# Patient Record
Sex: FEMALE | Race: WHITE | Employment: UNEMPLOYED | URBAN - METROPOLITAN AREA
[De-identification: names, ages, dates, MRNs, and addresses within clinical notes are randomized per-mention and may not be internally consistent; named-entity substitution may affect disease eponyms.]

---

## 2018-12-16 ENCOUNTER — APPOINTMENT (OUTPATIENT)
Dept: GENERAL RADIOLOGY | Age: 53
End: 2018-12-16
Payer: COMMERCIAL

## 2018-12-16 ENCOUNTER — HOSPITAL ENCOUNTER (EMERGENCY)
Age: 53
Discharge: HOME | End: 2018-12-16
Attending: EMERGENCY MEDICINE
Payer: COMMERCIAL

## 2018-12-16 VITALS
SYSTOLIC BLOOD PRESSURE: 104 MMHG | HEIGHT: 65 IN | HEART RATE: 74 BPM | RESPIRATION RATE: 16 BRPM | OXYGEN SATURATION: 100 % | BODY MASS INDEX: 46.65 KG/M2 | DIASTOLIC BLOOD PRESSURE: 47 MMHG | WEIGHT: 280 LBS | TEMPERATURE: 99 F

## 2018-12-16 DIAGNOSIS — J20.9 ACUTE BRONCHITIS, UNSPECIFIED ORGANISM: ICD-10-CM

## 2018-12-16 DIAGNOSIS — J06.9 VIRAL URI: Primary | ICD-10-CM

## 2018-12-16 DIAGNOSIS — J44.9 CHRONIC OBSTRUCTIVE PULMONARY DISEASE, UNSPECIFIED COPD TYPE (HCC): ICD-10-CM

## 2018-12-16 LAB
A/G RATIO: 1.3 (ref 1.1–2.2)
ALBUMIN SERPL-MCNC: 4 G/DL (ref 3.4–5)
ALP BLD-CCNC: 73 U/L (ref 40–129)
ALT SERPL-CCNC: 22 U/L (ref 10–40)
ANION GAP SERPL CALCULATED.3IONS-SCNC: 9 MMOL/L (ref 3–16)
AST SERPL-CCNC: 19 U/L (ref 15–37)
ATYPICAL LYMPHOCYTE RELATIVE PERCENT: 18 % (ref 0–6)
BASOPHILS ABSOLUTE: 0 K/UL (ref 0–0.2)
BASOPHILS RELATIVE PERCENT: 0 %
BILIRUB SERPL-MCNC: 0.8 MG/DL (ref 0–1)
BUN BLDV-MCNC: 12 MG/DL (ref 7–20)
CALCIUM SERPL-MCNC: 8.8 MG/DL (ref 8.3–10.6)
CHLORIDE BLD-SCNC: 104 MMOL/L (ref 99–110)
CO2: 25 MMOL/L (ref 21–32)
CREAT SERPL-MCNC: 0.8 MG/DL (ref 0.6–1.1)
EKG ATRIAL RATE: 69 BPM
EKG DIAGNOSIS: NORMAL
EKG P AXIS: 50 DEGREES
EKG P-R INTERVAL: 172 MS
EKG Q-T INTERVAL: 406 MS
EKG QRS DURATION: 92 MS
EKG QTC CALCULATION (BAZETT): 435 MS
EKG R AXIS: -9 DEGREES
EKG T AXIS: 33 DEGREES
EKG VENTRICULAR RATE: 69 BPM
EOSINOPHILS ABSOLUTE: 0 K/UL (ref 0–0.6)
EOSINOPHILS RELATIVE PERCENT: 0 %
GFR AFRICAN AMERICAN: >60
GFR NON-AFRICAN AMERICAN: >60
GLOBULIN: 3.2 G/DL
GLUCOSE BLD-MCNC: 89 MG/DL (ref 70–99)
HCT VFR BLD CALC: 43.4 % (ref 36–48)
HEMATOLOGY PATH CONSULT: YES
HEMOGLOBIN: 14.6 G/DL (ref 12–16)
LIPASE: 34 U/L (ref 13–60)
LYMPHOCYTES ABSOLUTE: 1.5 K/UL (ref 1–5.1)
LYMPHOCYTES RELATIVE PERCENT: 37 %
MCH RBC QN AUTO: 30.7 PG (ref 26–34)
MCHC RBC AUTO-ENTMCNC: 33.6 G/DL (ref 31–36)
MCV RBC AUTO: 91.5 FL (ref 80–100)
MONOCYTES ABSOLUTE: 0.2 K/UL (ref 0–1.3)
MONOCYTES RELATIVE PERCENT: 8 %
NEUTROPHILS ABSOLUTE: 1 K/UL (ref 1.7–7.7)
NEUTROPHILS RELATIVE PERCENT: 37 %
PDW BLD-RTO: 13.6 % (ref 12.4–15.4)
PLATELET # BLD: 173 K/UL (ref 135–450)
PLATELET SLIDE REVIEW: ADEQUATE
PMV BLD AUTO: 9 FL (ref 5–10.5)
POTASSIUM SERPL-SCNC: 3.8 MMOL/L (ref 3.5–5.1)
RAPID INFLUENZA  B AGN: NEGATIVE
RAPID INFLUENZA A AGN: NEGATIVE
RBC # BLD: 4.74 M/UL (ref 4–5.2)
RBC # BLD: NORMAL 10*6/UL
SLIDE REVIEW: ABNORMAL
SODIUM BLD-SCNC: 138 MMOL/L (ref 136–145)
TOTAL PROTEIN: 7.2 G/DL (ref 6.4–8.2)
TROPONIN: <0.01 NG/ML
TROPONIN: <0.01 NG/ML
WBC # BLD: 2.8 K/UL (ref 4–11)

## 2018-12-16 PROCEDURE — 93005 ELECTROCARDIOGRAM TRACING: CPT | Performed by: PHYSICIAN ASSISTANT

## 2018-12-16 PROCEDURE — 87804 INFLUENZA ASSAY W/OPTIC: CPT

## 2018-12-16 PROCEDURE — 96360 HYDRATION IV INFUSION INIT: CPT

## 2018-12-16 PROCEDURE — 93010 ELECTROCARDIOGRAM REPORT: CPT | Performed by: INTERNAL MEDICINE

## 2018-12-16 PROCEDURE — 85025 COMPLETE CBC W/AUTO DIFF WBC: CPT

## 2018-12-16 PROCEDURE — 83690 ASSAY OF LIPASE: CPT

## 2018-12-16 PROCEDURE — 80053 COMPREHEN METABOLIC PANEL: CPT

## 2018-12-16 PROCEDURE — 96361 HYDRATE IV INFUSION ADD-ON: CPT

## 2018-12-16 PROCEDURE — 71046 X-RAY EXAM CHEST 2 VIEWS: CPT

## 2018-12-16 PROCEDURE — 84484 ASSAY OF TROPONIN QUANT: CPT

## 2018-12-16 PROCEDURE — 99285 EMERGENCY DEPT VISIT HI MDM: CPT

## 2018-12-16 PROCEDURE — 2580000003 HC RX 258: Performed by: PHYSICIAN ASSISTANT

## 2018-12-16 PROCEDURE — 6370000000 HC RX 637 (ALT 250 FOR IP): Performed by: PHYSICIAN ASSISTANT

## 2018-12-16 RX ORDER — IPRATROPIUM BROMIDE AND ALBUTEROL SULFATE 2.5; .5 MG/3ML; MG/3ML
1 SOLUTION RESPIRATORY (INHALATION) ONCE
Status: COMPLETED | OUTPATIENT
Start: 2018-12-16 | End: 2018-12-16

## 2018-12-16 RX ORDER — 0.9 % SODIUM CHLORIDE 0.9 %
1000 INTRAVENOUS SOLUTION INTRAVENOUS ONCE
Status: COMPLETED | OUTPATIENT
Start: 2018-12-16 | End: 2018-12-16

## 2018-12-16 RX ORDER — ASPIRIN 81 MG/1
324 TABLET, CHEWABLE ORAL ONCE
Status: COMPLETED | OUTPATIENT
Start: 2018-12-16 | End: 2018-12-16

## 2018-12-16 RX ORDER — BROMPHENIRAMINE MALEATE, PSEUDOEPHEDRINE HYDROCHLORIDE, AND DEXTROMETHORPHAN HYDROBROMIDE 2; 30; 10 MG/5ML; MG/5ML; MG/5ML
10 SYRUP ORAL 4 TIMES DAILY PRN
Qty: 120 ML | Refills: 0 | Status: SHIPPED | OUTPATIENT
Start: 2018-12-16 | End: 2021-11-03

## 2018-12-16 RX ORDER — PREDNISONE 10 MG/1
60 TABLET ORAL DAILY
Qty: 30 TABLET | Refills: 0 | Status: SHIPPED | OUTPATIENT
Start: 2018-12-16 | End: 2018-12-21

## 2018-12-16 RX ORDER — HYDROCODONE BITARTRATE AND ACETAMINOPHEN 5; 325 MG/1; MG/1
1 TABLET ORAL EVERY 8 HOURS PRN
COMMUNITY

## 2018-12-16 RX ADMIN — IPRATROPIUM BROMIDE AND ALBUTEROL SULFATE 1 AMPULE: .5; 3 SOLUTION RESPIRATORY (INHALATION) at 17:37

## 2018-12-16 RX ADMIN — SODIUM CHLORIDE 1000 ML: 9 INJECTION, SOLUTION INTRAVENOUS at 17:37

## 2018-12-16 RX ADMIN — ASPIRIN 81 MG 324 MG: 81 TABLET ORAL at 17:37

## 2018-12-16 ASSESSMENT — ENCOUNTER SYMPTOMS
ABDOMINAL PAIN: 0
CHEST TIGHTNESS: 0
NAUSEA: 1
RHINORRHEA: 0
EYE PAIN: 0
BACK PAIN: 0
FACIAL SWELLING: 0
COUGH: 1
CONSTIPATION: 0
SHORTNESS OF BREATH: 1
VOMITING: 1
DIARRHEA: 1
SORE THROAT: 0
EYE REDNESS: 0

## 2018-12-16 ASSESSMENT — PAIN DESCRIPTION - PAIN TYPE: TYPE: ACUTE PAIN

## 2018-12-16 ASSESSMENT — HEART SCORE
ECG: 0
ECG: 0

## 2018-12-16 ASSESSMENT — PAIN SCALES - GENERAL: PAINLEVEL_OUTOF10: 5

## 2018-12-16 NOTE — ED PROVIDER NOTES
[address] ED  eMERGENCY dEPARTMENT eNCOUnter        Pt Name: Raymond Mcclain  MRN: [id number]  [place] 1965  Date of evaluation: 12/16/2018  Provider: Ying Chaparro PA-C  PCP: SUSAN Walker CNP    This patient was seen and evaluated by the attending physician No att. providers found. CHIEF COMPLAINT       Chief Complaint   Patient presents with    Shortness of Breath     pt c/o shortness of breath with coughing that started 3 days ago. Pt also c/o diarrhea and vomiting that started on thursday    Cough    Diarrhea       HISTORY OF PRESENT ILLNESS   (Location/Symptom, Timing/Onset, Context/Setting, Quality, Duration, Modifying Factors, Severity)  Note limiting factors. Raymond Mcclain is a 52 y.o. female clinic for evaluation of cough, chest pain, shortness of breath and diarrhea. She states she's been feeling symptoms for the past 3 days. She states that she started with coughing and nasal congestion which was draining down her throat. States this is causing a cough which is hacking and worse when she lies flat. States that yesterday she began to have some crampy diarrhea and became nauseous. States she vomited once yesterday but has not vomited any today. States she does feel a pressure sensation in her chest which feels like an elephant is sitting on it. She denies any history of heart attack. She denies any current nausea or vomiting. Denies abdominal pain. She does report she has a history of COPD and has felt more short of breath lately. Nursing Notes were all reviewed and agreed with or any disagreements were addressed  in the HPI. REVIEW OF SYSTEMS    (2-9 systems for level 4, 10 or more for level 5)     Review of Systems   Constitutional: Positive for fatigue. Negative for diaphoresis and fever. HENT: Negative for ear pain, facial swelling, postnasal drip, rhinorrhea and sore throat.     Eyes: Negative for pain, redness and visual status: Never Smoker    Smokeless tobacco: Never Used    Alcohol use Yes      Comment: occ    Drug use: No    Sexual activity: Not Asked     Other Topics Concern    None     Social History Narrative    None       SCREENINGS    [place] Coma Scale  Eye Opening: Spontaneous  Best Verbal Response: Oriented  Best Motor Response: Obeys commands  [place] Coma Scale Score: 15 Heart Score for chest pain patients  History: Moderately Suspicious  ECG: Normal  Patient Age: > 39 and < 65 years  *Risk factors for Atherosclerotic disease: Hypertension, Obesity  Risk Factors: 1 or 2 risk factors  Troponin: < 1X normal limit  Heart Score Total: 3      PHYSICAL EXAM    (up to 7 for level 4, 8 or more for level 5)     ED Triage Vitals [12/16/18 1648]   BP Temp Temp Source Pulse Resp SpO2 Height Weight   131/75 99 °F (37.2 °C) Temporal 68 18 97 % 5' 5\" (1.651 m) 280 lb (127 kg)       Physical Exam   Constitutional: She is oriented to person, place, and time. She appears well-developed and well-nourished. HENT:   Head: Normocephalic and atraumatic. Right Ear: Hearing and tympanic membrane normal.   Left Ear: Hearing and tympanic membrane normal.   Nose: Mucosal edema and rhinorrhea present. Right sinus exhibits no maxillary sinus tenderness and no frontal sinus tenderness. Left sinus exhibits no maxillary sinus tenderness and no frontal sinus tenderness. Mouth/Throat: Oropharynx is clear and moist. No oropharyngeal exudate, posterior oropharyngeal edema or posterior oropharyngeal erythema. Eyes: Pupils are equal, round, and reactive to light. EOM are normal. Right eye exhibits no discharge. Left eye exhibits no discharge. Neck: Normal range of motion. Neck supple. Cardiovascular: Normal rate, regular rhythm and normal heart sounds. Exam reveals no gallop and no friction rub. No murmur heard. Pulmonary/Chest: Effort normal. No respiratory distress. She has wheezes. She has no rales. Abdominal: Soft.  Bowel sounds

## 2018-12-17 LAB — HEMATOLOGY PATH CONSULT: NORMAL

## 2019-07-30 ENCOUNTER — APPOINTMENT (OUTPATIENT)
Dept: GENERAL RADIOLOGY | Age: 54
End: 2019-07-30

## 2019-07-30 ENCOUNTER — HOSPITAL ENCOUNTER (EMERGENCY)
Age: 54
Discharge: HOME | End: 2019-07-30

## 2019-07-30 VITALS
RESPIRATION RATE: 18 BRPM | WEIGHT: 280 LBS | SYSTOLIC BLOOD PRESSURE: 144 MMHG | TEMPERATURE: 97.7 F | BODY MASS INDEX: 45 KG/M2 | OXYGEN SATURATION: 98 % | DIASTOLIC BLOOD PRESSURE: 81 MMHG | HEIGHT: 66 IN | HEART RATE: 76 BPM
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
## 2019-07-30 DIAGNOSIS — M79.604 RIGHT LEG PAIN: Primary | ICD-10-CM

## 2019-07-30 PROCEDURE — 73590 X-RAY EXAM OF LOWER LEG: CPT

## 2019-07-30 PROCEDURE — 73600 X-RAY EXAM OF ANKLE: CPT

## 2019-07-30 PROCEDURE — 6370000000 HC RX 637 (ALT 250 FOR IP): Performed by: NURSE PRACTITIONER

## 2019-07-30 PROCEDURE — 93971 EXTREMITY STUDY: CPT

## 2019-07-30 PROCEDURE — 73562 X-RAY EXAM OF KNEE 3: CPT

## 2019-07-30 PROCEDURE — 99283 EMERGENCY DEPT VISIT LOW MDM: CPT

## 2019-07-30 RX ORDER — NAPROXEN 500 MG/1
500 TABLET ORAL 2 TIMES DAILY
Qty: 20 TABLET | Refills: 0 | Status: SHIPPED | OUTPATIENT
Start: 2019-07-30 | End: 2021-11-03

## 2019-07-30 RX ORDER — HYDROCODONE BITARTRATE AND ACETAMINOPHEN 5; 325 MG/1; MG/1
1 TABLET ORAL ONCE
Status: COMPLETED | OUTPATIENT
Start: 2019-07-30 | End: 2019-07-30

## 2019-07-30 RX ADMIN — HYDROCODONE BITARTRATE AND ACETAMINOPHEN 1 TABLET: 5; 325 TABLET ORAL at 12:49

## 2019-07-30 ASSESSMENT — ENCOUNTER SYMPTOMS
SHORTNESS OF BREATH: 0
COUGH: 0
ABDOMINAL PAIN: 0

## 2019-07-30 ASSESSMENT — PAIN DESCRIPTION - PAIN TYPE: TYPE: ACUTE PAIN

## 2019-07-30 ASSESSMENT — PAIN SCALES - GENERAL: PAINLEVEL_OUTOF10: 8

## 2019-07-30 NOTE — ED NOTES
Ace wrap placed on pt right leg    Pt declined crutches states she has a cane at home that she will use.       Elizabeth Galaviz RN  07/30/19 4347

## 2019-07-30 NOTE — ED PROVIDER NOTES
Evaluated by [id number] [facility] Provider          [facility] ED  eMERGENCY dEPARTMENT eNCOUnter        Pt Name: Chucho Graham  MRN: [id number]  [place] 1965  Dateof evaluation: 7/30/2019  Provider: SUSAN Corey CNP  PCP: SUSAN Treviño CNP  ED Attending: No att. providers found    [address] [place] [address]       Chief Complaint   Patient presents with    Leg Pain     pt c/o right leg pain with numbness from the knee down. Pt states, \" My sciatic nerve has been acting up and feel the nerve going down the leg\" Pt c/o falling because of it       HISTORY OF PRESENTILLNESS   (Location/Symptom, Timing/Onset, Context/Setting, Quality, Duration, Modifying Factors, Severity)  Note limiting factors. Chucho Graham is a 62 y.o. female for right lower leg pain and swelling. Onset was 5 days ago. Duration has been since the onset. Context includes pt states that she had a mechanical fall 5 days ago and has had pain and swelling to her right lower leg since the fall. Pt states she has right sided sciatica at baseline but this is different. Pt states her right calf is swollen and painfull. Alleviating factors include nothing. Aggravating factors include nothing. nothing has been used for pain today. Nursing Notes were all reviewed and agreed with or any disagreements were addressed  in the HPI. REVIEW OF SYSTEMS    (2-9 systems for level 4, 10 or more for level 5)     Review of Systems   Constitutional: Negative for fever. Respiratory: Negative for cough and shortness of breath. Cardiovascular: Negative for chest pain. Gastrointestinal: Negative for abdominal pain. Genitourinary: Negative for difficulty urinating. Musculoskeletal:        Right lower leg pain and swelling   All other systems reviewed and are negative. Positives and Pertinent negatives as per HPI. Except as noted above in the ROS, all other systems were reviewed and negative.        PAST MEDICAL HISTORY Past Medical History:   Diagnosis Date    COPD (chronic obstructive pulmonary disease) ([facility] [address]     Neuropathy          SURGICAL HISTORY       Past Surgical History:   Procedure Laterality Date    CHOLECYSTECTOMY           CURRENT MEDICATIONS       Discharge Medication List as of 7/30/2019  3:38 PM      CONTINUE these medications which have NOT CHANGED    Details   HYDROcodone-acetaminophen (NORCO) 5-325 MG per tablet Take 1 tablet by mouth every 8 hours as needed for Pain. Anahy Guy Historical Med      brompheniramine-pseudoephedrine-DM (BROMFED DM) 30-2-10 MG/5ML syrup Take 10 mLs by mouth 4 times daily as needed for Congestion or Cough, Disp-120 mL, R-0Print      gabapentin (NEURONTIN) 300 MG capsule Take 900 mg by mouth 3 times daily. Historical Med      albuterol sulfate HFA (PROAIR HFA) 108 (90 Base) MCG/ACT inhaler Use every 4 hours while awake for 7-10 days then PRN wheezing  Dispense with SPACER and Instruct on use. May sub Ventolin or Proventil as needed per Insurance., Disp-1 Inhaler, R-1Print               ALLERGIES     Sulfa antibiotics    FAMILY HISTORY     History reviewed. No pertinent family history. SOCIAL HISTORY       Social History     Socioeconomic History    Marital status:       Spouse name: None    Number of children: None    Years of education: None    Highest education level: None   Occupational History    None   Social Needs    Financial resource strain: None    Food insecurity:     Worry: None     Inability: None    Transportation needs:     Medical: None     Non-medical: None   Tobacco Use    Smoking status: Never Smoker    Smokeless tobacco: Never Used   Substance and Sexual Activity    Alcohol use: Yes     Comment: occ    Drug use: No    Sexual activity: None   Lifestyle    Physical activity:     Days per week: None     Minutes per session: None    Stress: None   Relationships    Social connections:     Talks on phone: None     Gets together: None     Attends Right vascular ultrasound interpreted by radiology tech for  Impressions  Right Impression  1. There is complete compressibility of all deep and superficial veins  throughout the lower extremity. 2. There is normal spontaneous and phasic flow throughout the lower extremity. Left Impression  1. There is complete compressibility of the common femoral vein. 2. There is normal spontaneous and phasic flow in the common femoral vein. Interpretation per the Radiologist below, if available at the time of this note:    VL Extremity Venous Right   Final Result      XR ANKLE RIGHT (2 VIEWS)   Final Result   No acute bony or joint abnormalities seen in the right tibia and fibula or   the right ankle         XR TIBIA FIBULA RIGHT (2 VIEWS)   Final Result   No acute bony or joint abnormalities seen in the right tibia and fibula or   the right ankle         XR KNEE RIGHT (3 VIEWS)   Final Result   1. No acute bony or joint abnormality   2. Tricompartmental osteoarthritic changes           Right ankle and tibia-fibula x-ray x-ray interpreted by radiologist for no acute bony or joint abnormality seen in the right tibia and fibula or right ankle. Right knee x-ray interpreted by radiologist for no acute bony or joint abnormality. Tricompartmental osteoarthritic changes noted. No results found.       PROCEDURES   Unless otherwise noted below, none     Procedures    CRITICAL CARE TIME   N/A    CONSULTS:  None      EMERGENCYDEPARTMENT COURSE and DIFFERENTIAL DIAGNOSIS/MDM:   Vitals:    Vitals:    07/30/19 1226 07/30/19 1227 07/30/19 1230 07/30/19 1535   BP:  (!) 162/119 (!) 162/119 (!) 144/81   Pulse: 73   76   Resp: 16   18   Temp: 97.7 °F (36.5 °C)      TempSrc: Oral      SpO2: 95%  92% 98%   Weight: 280 lb (127 kg)      Height: 5' 6\" (1.676 m)          Patient was given the following medications:  Medications   HYDROcodone-acetaminophen (NORCO) 5-325 MG per tablet 1 tablet (1 tablet Oral Given 7/30/19 1249)

## 2020-04-30 ENCOUNTER — HOSPITAL ENCOUNTER (OUTPATIENT)
Dept: GENERAL RADIOLOGY | Age: 55
Discharge: HOME | End: 2020-04-30
Payer: COMMERCIAL

## 2020-04-30 ENCOUNTER — HOSPITAL ENCOUNTER (OUTPATIENT)
Age: 55
Discharge: HOME | End: 2020-04-30
Payer: COMMERCIAL

## 2020-04-30 PROCEDURE — 72100 X-RAY EXAM L-S SPINE 2/3 VWS: CPT

## 2020-08-04 ENCOUNTER — APPOINTMENT (OUTPATIENT)
Dept: CT IMAGING | Age: 55
End: 2020-08-04
Payer: COMMERCIAL

## 2020-08-04 ENCOUNTER — APPOINTMENT (OUTPATIENT)
Dept: ULTRASOUND IMAGING | Age: 55
End: 2020-08-04
Payer: COMMERCIAL

## 2020-08-04 ENCOUNTER — HOSPITAL ENCOUNTER (EMERGENCY)
Age: 55
Discharge: HOME | End: 2020-08-04
Attending: STUDENT IN AN ORGANIZED HEALTH CARE EDUCATION/TRAINING PROGRAM
Payer: COMMERCIAL

## 2020-08-04 VITALS
RESPIRATION RATE: 16 BRPM | DIASTOLIC BLOOD PRESSURE: 79 MMHG | WEIGHT: 293 LBS | SYSTOLIC BLOOD PRESSURE: 134 MMHG | HEIGHT: 66 IN | OXYGEN SATURATION: 95 % | HEART RATE: 71 BPM | BODY MASS INDEX: 47.09 KG/M2 | TEMPERATURE: 98.7 F

## 2020-08-04 LAB
ANION GAP SERPL CALCULATED.3IONS-SCNC: 12 MMOL/L (ref 3–16)
BACTERIA: ABNORMAL /HPF
BASOPHILS ABSOLUTE: 0 K/UL (ref 0–0.2)
BASOPHILS RELATIVE PERCENT: 0.6 %
BILIRUBIN URINE: ABNORMAL
BLOOD, URINE: ABNORMAL
BUN BLDV-MCNC: 13 MG/DL (ref 7–20)
CALCIUM SERPL-MCNC: 9.3 MG/DL (ref 8.3–10.6)
CHLORIDE BLD-SCNC: 102 MMOL/L (ref 99–110)
CLARITY: ABNORMAL
CO2: 27 MMOL/L (ref 21–32)
COLOR: YELLOW
CREAT SERPL-MCNC: 0.8 MG/DL (ref 0.6–1.1)
EOSINOPHILS ABSOLUTE: 0 K/UL (ref 0–0.6)
EOSINOPHILS RELATIVE PERCENT: 0.3 %
EPITHELIAL CELLS, UA: ABNORMAL /HPF (ref 0–5)
GFR AFRICAN AMERICAN: >60
GFR NON-AFRICAN AMERICAN: >60
GLUCOSE BLD-MCNC: 111 MG/DL (ref 70–99)
GLUCOSE URINE: NEGATIVE MG/DL
HCT VFR BLD CALC: 43.2 % (ref 36–48)
HEMOGLOBIN: 14.4 G/DL (ref 12–16)
KETONES, URINE: 15 MG/DL
LEUKOCYTE ESTERASE, URINE: NEGATIVE
LYMPHOCYTES ABSOLUTE: 0.8 K/UL (ref 1–5.1)
LYMPHOCYTES RELATIVE PERCENT: 24.9 %
MCH RBC QN AUTO: 30.1 PG (ref 26–34)
MCHC RBC AUTO-ENTMCNC: 33.3 G/DL (ref 31–36)
MCV RBC AUTO: 90.4 FL (ref 80–100)
MICROSCOPIC EXAMINATION: YES
MONOCYTES ABSOLUTE: 0.2 K/UL (ref 0–1.3)
MONOCYTES RELATIVE PERCENT: 7.6 %
NEUTROPHILS ABSOLUTE: 2.1 K/UL (ref 1.7–7.7)
NEUTROPHILS RELATIVE PERCENT: 66.6 %
NITRITE, URINE: NEGATIVE
PDW BLD-RTO: 14.2 % (ref 12.4–15.4)
PH UA: 5.5 (ref 5–8)
PLATELET # BLD: 138 K/UL (ref 135–450)
PMV BLD AUTO: 9.8 FL (ref 5–10.5)
POTASSIUM REFLEX MAGNESIUM: 3.7 MMOL/L (ref 3.5–5.1)
PROTEIN UA: 30 MG/DL
RBC # BLD: 4.78 M/UL (ref 4–5.2)
RBC UA: ABNORMAL /HPF (ref 0–4)
SEDIMENTATION RATE, ERYTHROCYTE: 21 MM/HR (ref 0–30)
SODIUM BLD-SCNC: 141 MMOL/L (ref 136–145)
SPECIFIC GRAVITY UA: >=1.03 (ref 1–1.03)
URINE REFLEX TO CULTURE: ABNORMAL
URINE TYPE: ABNORMAL
UROBILINOGEN, URINE: 0.2 E.U./DL
WBC # BLD: 3.2 K/UL (ref 4–11)
WBC UA: ABNORMAL /HPF (ref 0–5)

## 2020-08-04 PROCEDURE — 76830 TRANSVAGINAL US NON-OB: CPT

## 2020-08-04 PROCEDURE — 80048 BASIC METABOLIC PNL TOTAL CA: CPT

## 2020-08-04 PROCEDURE — 72132 CT LUMBAR SPINE W/DYE: CPT

## 2020-08-04 PROCEDURE — 76856 US EXAM PELVIC COMPLETE: CPT

## 2020-08-04 PROCEDURE — 6360000004 HC RX CONTRAST MEDICATION: Performed by: STUDENT IN AN ORGANIZED HEALTH CARE EDUCATION/TRAINING PROGRAM

## 2020-08-04 PROCEDURE — 81001 URINALYSIS AUTO W/SCOPE: CPT

## 2020-08-04 PROCEDURE — 85652 RBC SED RATE AUTOMATED: CPT

## 2020-08-04 PROCEDURE — 96374 THER/PROPH/DIAG INJ IV PUSH: CPT

## 2020-08-04 PROCEDURE — 99284 EMERGENCY DEPT VISIT MOD MDM: CPT

## 2020-08-04 PROCEDURE — 85025 COMPLETE CBC W/AUTO DIFF WBC: CPT

## 2020-08-04 PROCEDURE — 6360000002 HC RX W HCPCS: Performed by: STUDENT IN AN ORGANIZED HEALTH CARE EDUCATION/TRAINING PROGRAM

## 2020-08-04 PROCEDURE — 6370000000 HC RX 637 (ALT 250 FOR IP): Performed by: STUDENT IN AN ORGANIZED HEALTH CARE EDUCATION/TRAINING PROGRAM

## 2020-08-04 RX ORDER — LIDOCAINE 4 G/G
1 PATCH TOPICAL ONCE
Status: DISCONTINUED | OUTPATIENT
Start: 2020-08-04 | End: 2020-08-04 | Stop reason: HOSPADM

## 2020-08-04 RX ORDER — KETOROLAC TROMETHAMINE 30 MG/ML
30 INJECTION, SOLUTION INTRAMUSCULAR; INTRAVENOUS ONCE
Status: COMPLETED | OUTPATIENT
Start: 2020-08-04 | End: 2020-08-04

## 2020-08-04 RX ORDER — METHOCARBAMOL 500 MG/1
500 TABLET, FILM COATED ORAL 4 TIMES DAILY
Qty: 40 TABLET | Refills: 0 | Status: SHIPPED | OUTPATIENT
Start: 2020-08-04 | End: 2020-08-14

## 2020-08-04 RX ORDER — METHOCARBAMOL 500 MG/1
1000 TABLET, FILM COATED ORAL ONCE
Status: COMPLETED | OUTPATIENT
Start: 2020-08-04 | End: 2020-08-04

## 2020-08-04 RX ORDER — LIDOCAINE 50 MG/G
1 PATCH TOPICAL DAILY
Qty: 10 PATCH | Refills: 0 | Status: SHIPPED | OUTPATIENT
Start: 2020-08-04 | End: 2020-08-14

## 2020-08-04 RX ADMIN — METHOCARBAMOL TABLETS 1000 MG: 500 TABLET, COATED ORAL at 12:26

## 2020-08-04 RX ADMIN — KETOROLAC TROMETHAMINE 30 MG: 30 INJECTION, SOLUTION INTRAMUSCULAR at 12:26

## 2020-08-04 RX ADMIN — IOPAMIDOL 75 ML: 755 INJECTION, SOLUTION INTRAVENOUS at 13:47

## 2020-08-04 ASSESSMENT — PAIN SCALES - GENERAL
PAINLEVEL_OUTOF10: 10
PAINLEVEL_OUTOF10: 10
PAINLEVEL_OUTOF10: 8

## 2020-08-04 ASSESSMENT — PAIN DESCRIPTION - LOCATION: LOCATION: BACK

## 2020-08-04 ASSESSMENT — PAIN DESCRIPTION - PAIN TYPE: TYPE: ACUTE PAIN

## 2020-08-04 NOTE — ED NOTES
Patient off floor to [address] [place]  08/04/20 595 [facility], RN  08/04/20 2784

## 2020-08-04 NOTE — ED PROVIDER NOTES
[address] ED      CHIEF COMPLAINT  Back Pain (5 herniated discs in back, needs surgery. Pt having increased pain. urinary incontinence for the past few weeks. ) and Vaginal Bleeding (spontaneous vaginal bleeding over the past month. Post-menapausal since 2012. )       HISTORY OF PRESENT ILLNESS  Ashok Chase is a 60 y.o. female  who presents to the ED complaining of acute on chronic bilateral lower back pain, patient states that the pain acutely worsened yesterday. Patient states that she has a history of degenerative disc disease, is supposed to undergo surgery but has not scheduled that yet. States that over the past few weeks, she is also developed some urinary incontinence and is having to wear depends. She denies any associated fevers or chills, new weaknesses, does endorse some numbness of her bilateral lower extremities but she states this is also chronic. She has been taking Percocet at home without relief of the pain. She also notes that over the past month, she has had vaginal spotting for the first time since 2012 when she went through menopause. She states that it is not a significant amount of bleeding, denies any abnormal vaginal discharge or concern for sexually transmitted infections. She denies any dysuria or hematuria. She states that she is really just here for worsening of her chronic back pain. She did drive herself here today. She denies any other complaints or concerns at this time. Denies history of IV drug use. No other complaints, modifying factors or associated symptoms. I have reviewed the following from the nursing documentation. Past Medical History:   Diagnosis Date    COPD (chronic obstructive pulmonary disease) ([facility] [address]     Neuropathy      Past Surgical History:   Procedure Laterality Date    CHOLECYSTECTOMY       History reviewed. No pertinent family history. Social History     Socioeconomic History    Marital status:       Spouse name: Not on file    Number of children: Not on file    Years of education: Not on file    Highest education level: Not on file   Occupational History    Not on file   Social Needs    Financial resource strain: Not on file    Food insecurity     Worry: Not on file     Inability: Not on file    Transportation needs     Medical: Not on file     Non-medical: Not on file   Tobacco Use    Smoking status: Never Smoker    Smokeless tobacco: Never Used   Substance and Sexual Activity    Alcohol use: Yes     Comment: occ    Drug use: No    Sexual activity: Not on file   Lifestyle    Physical activity     Days per week: Not on file     Minutes per session: Not on file    Stress: Not on file   Relationships    Social connections     Talks on phone: Not on file     Gets together: Not on file     Attends Christian service: Not on file     Active member of club or organization: Not on file     Attends meetings of clubs or organizations: Not on file     Relationship status: Not on file    Intimate partner violence     Fear of current or ex partner: Not on file     Emotionally abused: Not on file     Physically abused: Not on file     Forced sexual activity: Not on file   Other Topics Concern    Not on file   Social History Narrative    Not on file     Current Facility-Administered Medications   Medication Dose Route Frequency Provider Last Rate Last Dose    lidocaine 4 % external patch 1 patch  1 patch Transdermal Once Millie Short MD   1 patch at 08/04/20 1226     Current Outpatient Medications   Medication Sig Dispense Refill    lidocaine (LIDODERM) 5 % Place 1 patch onto the skin daily for 10 days 12 hours on, 12 hours off.  10 patch 0    methocarbamol (ROBAXIN) 500 MG tablet Take 1 tablet by mouth 4 times daily for 10 days 40 tablet 0    naproxen (NAPROSYN) 500 MG tablet Take 1 tablet by mouth 2 times daily for 20 doses 20 tablet 0    HYDROcodone-acetaminophen (NORCO) 5-325 MG per tablet Take 1 tablet by mouth every 8 hours as needed for Pain. Lewascencion Gaxiola brompheniramine-pseudoephedrine-DM (BROMFED DM) 30-2-10 MG/5ML syrup Take 10 mLs by mouth 4 times daily as needed for Congestion or Cough 120 mL 0    gabapentin (NEURONTIN) 300 MG capsule Take 900 mg by mouth 3 times daily.  albuterol sulfate HFA (PROAIR HFA) 108 (90 Base) MCG/ACT inhaler Use every 4 hours while awake for 7-10 days then PRN wheezing  Dispense with SPACER and Instruct on use. May sub Ventolin or Proventil as needed per [employer]. 1 Inhaler 1     Allergies   Allergen Reactions    Sulfa Antibiotics        REVIEW OF SYSTEMS  10 systems reviewed, pertinent positives per HPI otherwise noted to be negative. PHYSICAL EXAM  /79   Pulse 71   Temp 98.7 °F (37.1 °C) (Oral)   Resp 16   Ht 5' 6\" (1.676 m)   Wt (!) 302 lb (137 kg)   SpO2 95%   BMI 48.74 kg/m²    GENERAL APPEARANCE: Awake and alert. Cooperative. No acute distress but tearful. HENT: Normocephalic. Atraumatic. Mucous membranes are Moist.  NECK: Supple. EYES: PERRL. EOM's grossly intact. HEART/CHEST: RRR. No murmurs. LUNGS: Respirations unlabored. CTAB. Good air exchange. Speaking comfortably in full sentences. ABDOMEN: No tenderness. Soft. Non-distended. No masses. No organomegaly. No guarding or rebound. MUSCULOSKELETAL: No extremity edema. Compartments soft. No deformity. No tenderness in the extremities. All extremities neurovascularly intact. BACK: No midline tenderness palpation of the C or T-spine. She does have some mild midline tenderness palpation in the lumbar spine, as well as significant tenderness palpation of the bilateral SI joints. SKIN: Warm and dry. No acute rashes. NEUROLOGICAL: Alert and oriented. CN's 2-12 intact. No gross facial drooping. Strength 5/5, sensation intact in bilateral upper and lower extremities. Normal gait, able to ambulate without difficulty. PSYCHIATRIC: Normal mood and affect.   PELVIC: Performed with RN chaperone at Urine Reflex to Culture Not Indicated    Microscopic Urinalysis   Result Value Ref Range    WBC, UA 0-2 0 - 5 /HPF    RBC, UA  (A) 0 - 4 /HPF    Epithelial Cells, UA 21-50 (A) 0 - 5 /HPF    Bacteria, UA 3+ (A) None Seen /HPF       RADIOLOGY  CT LUMBAR SPINE W CONTRAST   Final Result   Unremarkable contrast enhanced CT of the lumbosacral spine. Hepatic steatosis. Nonobstructing left renal stone. US NON OB TRANSVAGINAL   Final Result   1. Nonvisualization of the left ovary. 2. Endometrial stripe thickness is abnormally enlarged in a postmenopausal   female with bleeding measuring 9 mm. Differential considerations include   endometrial carcinoma or endometrial hyperplasia. Further evaluation with   endometrial sampling recommended. 3. Normal right ovary with normal arterial and venous Doppler flow. 4. Nabothian cysts in the cervix. US PELVIS COMPLETE   Final Result   1. Nonvisualization of the left ovary. 2. Endometrial stripe thickness is abnormally enlarged in a postmenopausal   female with bleeding measuring 9 mm. Differential considerations include   endometrial carcinoma or endometrial hyperplasia. Further evaluation with   endometrial sampling recommended. 3. Normal right ovary with normal arterial and venous Doppler flow. 4. Nabothian cysts in the cervix. ED COURSE/MDM  Patient seen and evaluated. Old records reviewed. Labs and imaging reviewed and results discussed with patient. Patient is a 20-year-old female with history of chronic low back pain who presents with acute exacerbation of her chronic low back pain as well as 1 week history of vaginal bleeding which is postmenopausal.  HPI detailed above. Upon arrival in the ED, vitals reassuring. Her neurologic exam is normal.  She does state that she has had some urinary incontinence over the past several weeks. She has no other red flag symptoms for her back pain.   Labs are obtained and patient with a normal ESR. I believe that her back pain is an acute on chronic exacerbation. She was treated symptomatically here with Robaxin, Lidoderm patches, Toradol. Also with concern for vaginal bleeding, a transvaginal ultrasound was performed which showed a thickened endometrial stripe. Pelvic exam was performed which did not reveal any evidence of active bleeding or any visualized masses. Labs revealed a white blood cell count 3.2 but otherwise reassuring with no acute concerning electrolyte abnormalities and no evidence of anemia. Patient was able to ambulate without difficulty throughout her course of stay in the department and had symptomatic improvement after treatment. The findings of her imaging were discussed in depth with her and she understands the need for close follow-up with gynecology for further evaluation of her postmenopausal bleeding and thickened endometrial stripe. She was also advised to follow-up with her primary care provider regarding pain management for her chronic low back pain. She is comfortable and in agreement plan of care. She will be discharged. She is given return precautions. She will be sent with a prescription for Robaxin and and lidocaine patches. She was given contact information for gynecology for close follow-up. During the patient's ED course, the patient was given:  Medications   lidocaine 4 % external patch 1 patch (1 patch Transdermal Patch Applied 8/4/20 1226)   ketorolac (TORADOL) injection 30 mg (30 mg Intravenous Given 8/4/20 1226)   methocarbamol (ROBAXIN) tablet 1,000 mg (1,000 mg Oral Given 8/4/20 1226)   iopamidol (ISOVUE-370) 76 % injection 75 mL (75 mLs Intravenous Given 8/4/20 1347)        CLINICAL IMPRESSION  1. Acute exacerbation of chronic low back pain    2. Endometrial thickening on ultrasound    3. Postmenopausal bleeding        Blood pressure 134/79, pulse 71, temperature 98.7 °F (37.1 °C), temperature source Oral, resp.  rate 16, height 5' 6\" (1.676 m), weight (!) 302 lb (137 kg), SpO2 95 %. DISPOSITION  Alok Valerio was discharged to home in good condition. Patient was given scripts for the following medications. I counseled patient how to take these medications. Discharge Medication List as of 8/4/2020  2:58 PM      START taking these medications    Details   lidocaine (LIDODERM) 5 % Place 1 patch onto the skin daily for 10 days 12 hours on, 12 hours off., Disp-10 patch,R-0Print      methocarbamol (ROBAXIN) 500 MG tablet Take 1 tablet by mouth 4 times daily for 10 days, Disp-40 tablet,R-0Print             Follow-up with: SUSAN Gomez CNP    Schedule an appointment as soon as possible for a visit       [facility]  [address] [facility]  Go to   If symptoms worsen    Corey Mclean, DO  [address]  [address] [place] [address]   [address]    Schedule an appointment as soon as possible for a visit   For evaluation of endometrial thickening and postmenopausal bleeding      DISCLAIMER: This chart was created using Dragon dictation software. Efforts were made by me to ensure accuracy, however some errors may be present due to limitations of this technology and occasionally words are not transcribed correctly.         Andrey Gonzalez MD  08/04/20 0711

## 2021-11-02 PROBLEM — Z90.710 S/P HYSTERECTOMY WITH OOPHORECTOMY: Status: ACTIVE | Noted: 2020-09-14

## 2021-11-02 PROBLEM — E66.01 CLASS 3 SEVERE OBESITY IN ADULT (HCC): Status: ACTIVE | Noted: 2018-12-18

## 2021-11-02 PROBLEM — M54.41 CHRONIC BILATERAL LOW BACK PAIN WITH BILATERAL SCIATICA: Status: ACTIVE | Noted: 2020-11-09

## 2021-11-02 PROBLEM — M54.42 CHRONIC BILATERAL LOW BACK PAIN WITH BILATERAL SCIATICA: Status: ACTIVE | Noted: 2020-11-09

## 2021-11-02 PROBLEM — C54.1 ENDOMETRIAL ADENOCARCINOMA (HCC): Status: ACTIVE | Noted: 2020-09-14

## 2021-11-02 PROBLEM — Z90.721 S/P HYSTERECTOMY WITH OOPHORECTOMY: Status: ACTIVE | Noted: 2020-09-14

## 2021-11-02 PROBLEM — M51.36 DDD (DEGENERATIVE DISC DISEASE), LUMBAR: Status: ACTIVE | Noted: 2020-11-09

## 2021-11-02 PROBLEM — M79.7 FIBROMYALGIA: Status: ACTIVE | Noted: 2020-11-09

## 2021-11-02 PROBLEM — G89.29 CHRONIC BILATERAL LOW BACK PAIN WITH BILATERAL SCIATICA: Status: ACTIVE | Noted: 2020-11-09

## 2021-11-02 PROBLEM — G89.4 CHRONIC PAIN DISORDER: Status: ACTIVE | Noted: 2020-11-09

## 2021-11-03 ENCOUNTER — HOSPITAL ENCOUNTER (OUTPATIENT)
Dept: WOUND CARE | Age: 56
Discharge: HOME | End: 2021-11-03
Payer: COMMERCIAL

## 2021-11-03 VITALS
WEIGHT: 293 LBS | BODY MASS INDEX: 48.82 KG/M2 | HEIGHT: 65 IN | HEART RATE: 72 BPM | RESPIRATION RATE: 18 BRPM | SYSTOLIC BLOOD PRESSURE: 129 MMHG | TEMPERATURE: 98 F | DIASTOLIC BLOOD PRESSURE: 81 MMHG

## 2021-11-03 DIAGNOSIS — L76.34 POSTOPERATIVE SEROMA OF SUBCUTANEOUS TISSUE AFTER NON-DERMATOLOGIC PROCEDURE: ICD-10-CM

## 2021-11-03 DIAGNOSIS — T81.31XA SURGICAL WOUND DEHISCENCE, INITIAL ENCOUNTER: ICD-10-CM

## 2021-11-03 PROCEDURE — 99243 OFF/OP CNSLTJ NEW/EST LOW 30: CPT | Performed by: INTERNAL MEDICINE

## 2021-11-03 PROCEDURE — 99213 OFFICE O/P EST LOW 20 MIN: CPT

## 2021-11-04 PROBLEM — T81.31XA SURGICAL WOUND DEHISCENCE, INITIAL ENCOUNTER: Status: ACTIVE | Noted: 2021-11-04

## 2021-11-04 PROBLEM — L76.34 POSTOPERATIVE SEROMA OF SUBCUTANEOUS TISSUE AFTER NON-DERMATOLOGIC PROCEDURE: Status: ACTIVE | Noted: 2021-11-04

## 2021-11-04 NOTE — CONSULTS
[facility] Consult Note    Shakir Albert     : 1965    DATE OF VISIT:  11/3/2021    Subjective:     Shakir Albert is a 60 y.o. female who has a dehisced surgical ulcer located on the back (midline / lumbar). Current complaint of pain in this area? yes. Quality of pain: aching and sharp  Timing: intermittent and decreasing in frequency  Severity: mild  Associated Signs/Symptoms: resolving redness, minimal to no drainage  Other significant symptoms or pertinent ulcer history: Mrs. Venkat Ramirez has a history of chronic low back pain and sciatica that was refractory to more conservative measures, so she underwent a spinal cord stimulation trial a number of weeks ago, and then a full spinal cord stimulator implant at the end of September. She's had significant pain relief from this, and has done very well overall. The right sided incision (at the stimulator generator pocket) has healed very well, with no issues at all. The midline incision (where the leads were directed from the generator into the spine) had a little bit of post-op pain, minor redness, and just the tiniest area of superficial dehiscence in the last week or so. In and of itself that wasn't too much of a concern, but she also had a CT scan of her abdomen and pelvis last week (for unrelated abdominal complaints), and a deep soft tissue fluid collection was noted, around the stimulator leads. So between the minor redness, the tiny dehiscence and the CT scan findings, Dr. Randy Street asked me to see her for an opinion as to whether we need to worry about a deeper wound complication or post-op infection. No F/C/D, no recent ABx (outside of OR prophylaxis), and the bit of redness that was apparently present last week seems to be resolving on its own (she thinks that might have been from a bit of pressure or friction from her back brace, which she's not wearing as much this week). No current specific dressing in place. Additional ulcer(s) noted? no.      Ms. Shalonda Flroes has a past medical history of Cancer ([facility] [address] COPD (chronic obstructive pulmonary disease) ([facility] [address] and Neuropathy. She has a past surgical history that includes Cholecystectomy; Hysterectomy (2020); and Spinal Cord Stimulator Surgery (09/28/2021). Her family history includes COPD in her mother; Cancer in her father. Ms. Shalonda Flores reports that she has never smoked. She has never used smokeless tobacco. She reports current alcohol use. She reports that she does not use drugs. Her current medication list consists of HYDROcodone-acetaminophen and Multiple Vitamin. Allergies: Sulfa antibiotics and Tramadol    Pertinent items from the review of systems are discussed in the HPI; the remainder of the ROS was reviewed and is negative. Objective:     Vitals:    11/03/21 0808 11/03/21 0820   BP:  129/81   Pulse:  72   Resp: 18    Temp: 98 °F (36.7 °C)    TempSrc: Oral    Weight: (!) 358 lb 12.8 oz (162.8 kg)    Height: 5' 5\" (1.651 m)        Constitutional:  well-developed, well-nourished, overweight, NAD  Psychiatric:  oriented to person, place and time; mood and affect appropriate for the situation   Eyes:  pupils equal, round and reactive to light; sclerae anicteric, conjunctivae not pale  Cardiovascular:  bilateral pedal pulses palpable; mild lower extremity edema  Musculoskeletal:  no clubbing, cyanosis or petechiae; lower back and pelvic / hip area with no gross effusions, joint misalignment or acute arthritis  Yudy-ulcer skin: indurated, pink, warm, dry and I think just a hint of resolving contact dermatitis. Ulcer(s): right sided generator pocket well-healed; midline lead-insertion site with a very tiny (1-2 mm) area of dehiscence, red, superficial, a bit dry, no necrotic tissue, no signs of infection.  Photos also saved in electronic chart.  ______________________________    Lab Results   Component Value Date    LABALBU 4.0 12/16/2018     Lab Results   Component Value Date    CREATININE 0.8 08/04/2020     Lab Results   Component Value Date    HGB 14.4 08/04/2020       CT scan (Oct 25) -- No acute abdominal/pelvic findings. Spinal stimulator device with a small 5.3 x 3.0 cm nonspecific fluid collection surrounding the leads within the superficial soft tissues of the lower back. Stimulator appears new from the fourth 20/1/2021 exam. Differential includes postoperative seroma or abscess although no air present within the collection. Assessment:     Patient Active Problem List   Diagnosis Code    Chronic pain disorder G89.4    Fibromyalgia M79.7    Class 3 severe obesity in adult ([facility] [address] E66.01    DDD (degenerative disc disease), lumbar M51.36    Endometrial adenocarcinoma ([facility] [address] C54.1    Chronic bilateral low back pain with bilateral sciatica M54.42, M54.41, G89.29    S/P hysterectomy with oophorectomy Z90.710, Z90.721    Asthma J45.909    Surgical wound dehiscence, initial encounter T81.31XA    Postoperative seroma of soft tissue after non-dermatologic procedure L76.34       Assessment of today's active condition(s): chronic neuropathic back and lower extremity pain, recent spinal cord stimulator implant, now with (a) a very tiny, superficial area of dehiscence in the midline incision, with no necrosis, no real signs of infection (maybe a bit of contact dermatitis from prior dressings, maybe a bit of resolving redness from recent pressure or friction from her back brace), and (b) a deep soft tissue fluid collection incidentally noted on a recent CT scan, likely just a simple seroma. Factors contributing to occurrence and/or persistence of the chronic ulcer include chronic pressure, shear force and obesity. Medical necessity of today's visit is shown by the above documentation. Sharp debridement is not indicated today, based upon the exam findings in the wound(s) above.      Discharge plan:     Treatment in the wound care center today, per RN documentation: polysporin and a BandAid. No Abx needed. I don't think we need to aspirate that fluid collection at this point. Would just keep that tiny wound clean and covered until healed (just a matter of days, I think). Call me if any fever, chills, increasing wound pain, any drainage there, return of redness, etc.   D/W Dr. Ilan Rios. Home treatment: good handwashing before and after any dressing changes. Cleanse ulcer with saline or soap & water before dressing change. May use Vaseline (petrolatum), Aquaphor, Aveeno, CeraVe, Cetaphil, Eucerin, Lubriderm, etc for dry skin. Dressing type for home: Antibiotic ointment and Dry cover dressing, once daily. Written discharge instructions given to patient. Follow up here PRN.     Electronically signed by Jesus Jimenez MD on 11/4/2021 at 2:32 PM.

## 2023-01-04 ENCOUNTER — HOSPITAL ENCOUNTER (OUTPATIENT)
Dept: GENERAL RADIOLOGY | Age: 58
Discharge: HOME | End: 2023-01-04
Payer: COMMERCIAL

## 2023-01-04 DIAGNOSIS — M79.604 RIGHT LEG PAIN: ICD-10-CM

## 2023-01-04 DIAGNOSIS — M25.552 BILATERAL HIP PAIN: ICD-10-CM

## 2023-01-04 DIAGNOSIS — M25.552 LEFT HIP PAIN: ICD-10-CM

## 2023-01-04 DIAGNOSIS — M25.551 BILATERAL HIP PAIN: ICD-10-CM

## 2023-01-04 DIAGNOSIS — M79.605 BILATERAL LEG PAIN: ICD-10-CM

## 2023-01-04 DIAGNOSIS — M79.604 BILATERAL LEG PAIN: ICD-10-CM

## 2023-01-04 PROCEDURE — 73590 X-RAY EXAM OF LOWER LEG: CPT

## 2023-01-04 PROCEDURE — 73502 X-RAY EXAM HIP UNI 2-3 VIEWS: CPT

## 2023-01-04 PROCEDURE — 72100 X-RAY EXAM L-S SPINE 2/3 VWS: CPT
